# Patient Record
Sex: MALE | Race: WHITE | NOT HISPANIC OR LATINO | Employment: FULL TIME | URBAN - METROPOLITAN AREA
[De-identification: names, ages, dates, MRNs, and addresses within clinical notes are randomized per-mention and may not be internally consistent; named-entity substitution may affect disease eponyms.]

---

## 2021-04-23 ENCOUNTER — OFFICE VISIT (OUTPATIENT)
Dept: INTERNAL MEDICINE | Facility: CLINIC | Age: 61
End: 2021-04-23
Payer: COMMERCIAL

## 2021-04-23 ENCOUNTER — HOSPITAL ENCOUNTER (OUTPATIENT)
Dept: CARDIOLOGY | Facility: HOSPITAL | Age: 61
Discharge: HOME | End: 2021-04-23
Payer: COMMERCIAL

## 2021-04-23 VITALS
HEIGHT: 72 IN | SYSTOLIC BLOOD PRESSURE: 110 MMHG | TEMPERATURE: 98 F | WEIGHT: 221.31 LBS | DIASTOLIC BLOOD PRESSURE: 74 MMHG | HEART RATE: 86 BPM | BODY MASS INDEX: 29.97 KG/M2

## 2021-04-23 DIAGNOSIS — I10 HYPERTENSION, UNSPECIFIED TYPE: ICD-10-CM

## 2021-04-23 DIAGNOSIS — E78.5 DYSLIPIDEMIA ASSOCIATED WITH TYPE 2 DIABETES MELLITUS: ICD-10-CM

## 2021-04-23 DIAGNOSIS — Z11.59 NEED FOR HEPATITIS C SCREENING TEST: ICD-10-CM

## 2021-04-23 DIAGNOSIS — Z00.00 ANNUAL PHYSICAL EXAM: ICD-10-CM

## 2021-04-23 DIAGNOSIS — Z12.5 PROSTATE CANCER SCREENING: ICD-10-CM

## 2021-04-23 DIAGNOSIS — E11.69 DYSLIPIDEMIA ASSOCIATED WITH TYPE 2 DIABETES MELLITUS: ICD-10-CM

## 2021-04-23 DIAGNOSIS — E11.65 UNCONTROLLED TYPE 2 DIABETES MELLITUS WITH HYPERGLYCEMIA: Primary | ICD-10-CM

## 2021-04-23 DIAGNOSIS — N40.0 BENIGN PROSTATIC HYPERPLASIA, UNSPECIFIED WHETHER LOWER URINARY TRACT SYMPTOMS PRESENT: ICD-10-CM

## 2021-04-23 PROCEDURE — 93010 EKG 12-LEAD: ICD-10-PCS | Mod: S$GLB,,, | Performed by: INTERNAL MEDICINE

## 2021-04-23 PROCEDURE — 1126F PR PAIN SEVERITY QUANTIFIED, NO PAIN PRESENT: ICD-10-PCS | Mod: S$GLB,,, | Performed by: FAMILY MEDICINE

## 2021-04-23 PROCEDURE — 99204 OFFICE O/P NEW MOD 45 MIN: CPT | Mod: S$GLB,,, | Performed by: FAMILY MEDICINE

## 2021-04-23 PROCEDURE — 3008F BODY MASS INDEX DOCD: CPT | Mod: CPTII,S$GLB,, | Performed by: FAMILY MEDICINE

## 2021-04-23 PROCEDURE — 93005 ELECTROCARDIOGRAM TRACING: CPT | Mod: PO

## 2021-04-23 PROCEDURE — 99204 PR OFFICE/OUTPT VISIT, NEW, LEVL IV, 45-59 MIN: ICD-10-PCS | Mod: S$GLB,,, | Performed by: FAMILY MEDICINE

## 2021-04-23 PROCEDURE — 99999 PR PBB SHADOW E&M-NEW PATIENT-LVL IV: CPT | Mod: PBBFAC,,, | Performed by: FAMILY MEDICINE

## 2021-04-23 PROCEDURE — 1126F AMNT PAIN NOTED NONE PRSNT: CPT | Mod: S$GLB,,, | Performed by: FAMILY MEDICINE

## 2021-04-23 PROCEDURE — 3008F PR BODY MASS INDEX (BMI) DOCUMENTED: ICD-10-PCS | Mod: CPTII,S$GLB,, | Performed by: FAMILY MEDICINE

## 2021-04-23 PROCEDURE — 93010 ELECTROCARDIOGRAM REPORT: CPT | Mod: S$GLB,,, | Performed by: INTERNAL MEDICINE

## 2021-04-23 PROCEDURE — 99999 PR PBB SHADOW E&M-NEW PATIENT-LVL IV: ICD-10-PCS | Mod: PBBFAC,,, | Performed by: FAMILY MEDICINE

## 2021-04-23 RX ORDER — ESCITALOPRAM OXALATE 20 MG/1
20 TABLET ORAL DAILY
Qty: 30 TABLET | Refills: 6 | Status: SHIPPED | OUTPATIENT
Start: 2021-04-23 | End: 2021-11-23

## 2021-04-23 RX ORDER — GLIMEPIRIDE 4 MG/1
4 TABLET ORAL
COMMUNITY
End: 2021-10-21 | Stop reason: SDUPTHER

## 2021-04-23 RX ORDER — LISINOPRIL 10 MG/1
10 TABLET ORAL DAILY
Qty: 30 TABLET | Refills: 6 | Status: SHIPPED | OUTPATIENT
Start: 2021-04-23 | End: 2021-11-08

## 2021-04-23 RX ORDER — FLASH GLUCOSE SCANNING READER
EACH MISCELLANEOUS
Qty: 2 EACH | Refills: 6 | Status: SHIPPED | OUTPATIENT
Start: 2021-04-23 | End: 2022-08-26

## 2021-04-23 RX ORDER — FLASH GLUCOSE SENSOR
KIT MISCELLANEOUS
Qty: 1 KIT | Refills: 6 | Status: SHIPPED | OUTPATIENT
Start: 2021-04-23 | End: 2021-08-10

## 2021-04-23 RX ORDER — INSULIN GLARGINE 100 [IU]/ML
60 INJECTION, SOLUTION SUBCUTANEOUS DAILY
COMMUNITY
End: 2021-05-14 | Stop reason: SDUPTHER

## 2021-04-23 RX ORDER — METFORMIN HYDROCHLORIDE 1000 MG/1
1000 TABLET ORAL 2 TIMES DAILY WITH MEALS
COMMUNITY
End: 2021-04-23 | Stop reason: SDUPTHER

## 2021-04-23 RX ORDER — MULTIVITAMIN
1 TABLET ORAL DAILY
COMMUNITY

## 2021-04-23 RX ORDER — TADALAFIL 20 MG/1
20 TABLET ORAL DAILY PRN
COMMUNITY

## 2021-04-23 RX ORDER — ESCITALOPRAM OXALATE 20 MG/1
20 TABLET ORAL DAILY
COMMUNITY
End: 2021-04-23 | Stop reason: SDUPTHER

## 2021-04-23 RX ORDER — SIMVASTATIN 20 MG/1
20 TABLET, FILM COATED ORAL NIGHTLY
Qty: 30 TABLET | Refills: 6 | Status: SHIPPED | OUTPATIENT
Start: 2021-04-23 | End: 2021-11-08

## 2021-04-23 RX ORDER — OMEGA-3-ACID ETHYL ESTERS 1 G/1
2 CAPSULE, LIQUID FILLED ORAL 2 TIMES DAILY
COMMUNITY
End: 2021-04-23 | Stop reason: SDUPTHER

## 2021-04-23 RX ORDER — TAMSULOSIN HYDROCHLORIDE 0.4 MG/1
CAPSULE ORAL DAILY
COMMUNITY
End: 2021-10-21 | Stop reason: SDUPTHER

## 2021-04-23 RX ORDER — LISINOPRIL 10 MG/1
10 TABLET ORAL DAILY
COMMUNITY
End: 2021-04-23 | Stop reason: SDUPTHER

## 2021-04-23 RX ORDER — OMEGA-3-ACID ETHYL ESTERS 1 G/1
2 CAPSULE, LIQUID FILLED ORAL 2 TIMES DAILY
Qty: 60 CAPSULE | Refills: 6 | Status: SHIPPED | OUTPATIENT
Start: 2021-04-23 | End: 2021-06-10 | Stop reason: SDUPTHER

## 2021-04-23 RX ORDER — ASPIRIN 81 MG/1
81 TABLET ORAL DAILY
COMMUNITY

## 2021-04-23 RX ORDER — SIMVASTATIN 20 MG/1
20 TABLET, FILM COATED ORAL NIGHTLY
COMMUNITY
End: 2021-04-23 | Stop reason: SDUPTHER

## 2021-04-23 RX ORDER — METFORMIN HYDROCHLORIDE 1000 MG/1
1000 TABLET ORAL 2 TIMES DAILY WITH MEALS
Qty: 60 TABLET | Refills: 6 | Status: SHIPPED | OUTPATIENT
Start: 2021-04-23 | End: 2021-11-08

## 2021-04-23 RX ORDER — CALCIUM CARBONATE 600 MG
600 TABLET ORAL ONCE
COMMUNITY

## 2021-04-26 ENCOUNTER — PATIENT MESSAGE (OUTPATIENT)
Dept: INTERNAL MEDICINE | Facility: CLINIC | Age: 61
End: 2021-04-26

## 2021-04-26 DIAGNOSIS — R06.02 SOB (SHORTNESS OF BREATH): ICD-10-CM

## 2021-04-26 DIAGNOSIS — I10 HYPERTENSION, UNSPECIFIED TYPE: ICD-10-CM

## 2021-04-26 DIAGNOSIS — R94.31 ABNORMAL EKG: Primary | ICD-10-CM

## 2021-04-26 DIAGNOSIS — E11.65 UNCONTROLLED TYPE 2 DIABETES MELLITUS WITH HYPERGLYCEMIA: ICD-10-CM

## 2021-04-28 ENCOUNTER — PATIENT OUTREACH (OUTPATIENT)
Dept: ADMINISTRATIVE | Facility: HOSPITAL | Age: 61
End: 2021-04-28

## 2021-05-11 ENCOUNTER — OFFICE VISIT (OUTPATIENT)
Dept: OPHTHALMOLOGY | Facility: CLINIC | Age: 61
End: 2021-05-11
Payer: COMMERCIAL

## 2021-05-11 ENCOUNTER — LAB VISIT (OUTPATIENT)
Dept: LAB | Facility: HOSPITAL | Age: 61
End: 2021-05-11
Attending: FAMILY MEDICINE
Payer: COMMERCIAL

## 2021-05-11 DIAGNOSIS — I10 HYPERTENSION, UNSPECIFIED TYPE: ICD-10-CM

## 2021-05-11 DIAGNOSIS — E11.65 UNCONTROLLED TYPE 2 DIABETES MELLITUS WITH HYPERGLYCEMIA: ICD-10-CM

## 2021-05-11 DIAGNOSIS — E11.69 DYSLIPIDEMIA ASSOCIATED WITH TYPE 2 DIABETES MELLITUS: ICD-10-CM

## 2021-05-11 DIAGNOSIS — D31.32 CHOROIDAL NEVUS OF LEFT EYE: ICD-10-CM

## 2021-05-11 DIAGNOSIS — Z00.00 ANNUAL PHYSICAL EXAM: ICD-10-CM

## 2021-05-11 DIAGNOSIS — Z11.59 NEED FOR HEPATITIS C SCREENING TEST: ICD-10-CM

## 2021-05-11 DIAGNOSIS — E11.36 DIABETIC CATARACT: ICD-10-CM

## 2021-05-11 DIAGNOSIS — H52.13 MYOPIA OF BOTH EYES: ICD-10-CM

## 2021-05-11 DIAGNOSIS — E78.5 DYSLIPIDEMIA ASSOCIATED WITH TYPE 2 DIABETES MELLITUS: ICD-10-CM

## 2021-05-11 DIAGNOSIS — Z12.5 PROSTATE CANCER SCREENING: ICD-10-CM

## 2021-05-11 DIAGNOSIS — E11.3293 MILD NONPROLIFERATIVE DIABETIC RETINOPATHY OF BOTH EYES WITHOUT MACULAR EDEMA ASSOCIATED WITH TYPE 2 DIABETES MELLITUS: Primary | ICD-10-CM

## 2021-05-11 LAB
ALBUMIN SERPL BCP-MCNC: 4 G/DL (ref 3.5–5.2)
ALP SERPL-CCNC: 76 U/L (ref 55–135)
ALT SERPL W/O P-5'-P-CCNC: 60 U/L (ref 10–44)
ANION GAP SERPL CALC-SCNC: 9 MMOL/L (ref 8–16)
AST SERPL-CCNC: 45 U/L (ref 10–40)
BASOPHILS # BLD AUTO: 0.04 K/UL (ref 0–0.2)
BASOPHILS NFR BLD: 0.6 % (ref 0–1.9)
BILIRUB SERPL-MCNC: 0.7 MG/DL (ref 0.1–1)
BUN SERPL-MCNC: 15 MG/DL (ref 6–20)
CALCIUM SERPL-MCNC: 9.9 MG/DL (ref 8.7–10.5)
CHLORIDE SERPL-SCNC: 101 MMOL/L (ref 95–110)
CHOLEST SERPL-MCNC: 155 MG/DL (ref 120–199)
CHOLEST/HDLC SERPL: 3.1 {RATIO} (ref 2–5)
CO2 SERPL-SCNC: 27 MMOL/L (ref 23–29)
CREAT SERPL-MCNC: 0.9 MG/DL (ref 0.5–1.4)
DIFFERENTIAL METHOD: ABNORMAL
EOSINOPHIL # BLD AUTO: 0.1 K/UL (ref 0–0.5)
EOSINOPHIL NFR BLD: 1.3 % (ref 0–8)
ERYTHROCYTE [DISTWIDTH] IN BLOOD BY AUTOMATED COUNT: 12.7 % (ref 11.5–14.5)
EST. GFR  (AFRICAN AMERICAN): >60 ML/MIN/1.73 M^2
EST. GFR  (NON AFRICAN AMERICAN): >60 ML/MIN/1.73 M^2
ESTIMATED AVG GLUCOSE: 252 MG/DL (ref 68–131)
GLUCOSE SERPL-MCNC: 217 MG/DL (ref 70–110)
HBA1C MFR BLD: 10.4 % (ref 4–5.6)
HCT VFR BLD AUTO: 46.5 % (ref 40–54)
HDLC SERPL-MCNC: 50 MG/DL (ref 40–75)
HDLC SERPL: 32.3 % (ref 20–50)
HGB BLD-MCNC: 15.7 G/DL (ref 14–18)
IMM GRANULOCYTES # BLD AUTO: 0.01 K/UL (ref 0–0.04)
IMM GRANULOCYTES NFR BLD AUTO: 0.1 % (ref 0–0.5)
LDLC SERPL CALC-MCNC: 81.2 MG/DL (ref 63–159)
LYMPHOCYTES # BLD AUTO: 2.5 K/UL (ref 1–4.8)
LYMPHOCYTES NFR BLD: 34.8 % (ref 18–48)
MCH RBC QN AUTO: 31 PG (ref 27–31)
MCHC RBC AUTO-ENTMCNC: 33.8 G/DL (ref 32–36)
MCV RBC AUTO: 92 FL (ref 82–98)
MONOCYTES # BLD AUTO: 0.4 K/UL (ref 0.3–1)
MONOCYTES NFR BLD: 5.8 % (ref 4–15)
NEUTROPHILS # BLD AUTO: 4 K/UL (ref 1.8–7.7)
NEUTROPHILS NFR BLD: 57.4 % (ref 38–73)
NONHDLC SERPL-MCNC: 105 MG/DL
NRBC BLD-RTO: 0 /100 WBC
PLATELET # BLD AUTO: 136 K/UL (ref 150–450)
PMV BLD AUTO: 12.5 FL (ref 9.2–12.9)
POTASSIUM SERPL-SCNC: 4.5 MMOL/L (ref 3.5–5.1)
PROT SERPL-MCNC: 7.2 G/DL (ref 6–8.4)
RBC # BLD AUTO: 5.07 M/UL (ref 4.6–6.2)
SODIUM SERPL-SCNC: 137 MMOL/L (ref 136–145)
TRIGL SERPL-MCNC: 119 MG/DL (ref 30–150)
WBC # BLD AUTO: 7.04 K/UL (ref 3.9–12.7)

## 2021-05-11 PROCEDURE — 84153 ASSAY OF PSA TOTAL: CPT | Performed by: FAMILY MEDICINE

## 2021-05-11 PROCEDURE — 80061 LIPID PANEL: CPT | Performed by: FAMILY MEDICINE

## 2021-05-11 PROCEDURE — 80053 COMPREHEN METABOLIC PANEL: CPT | Performed by: FAMILY MEDICINE

## 2021-05-11 PROCEDURE — 92015 DETERMINE REFRACTIVE STATE: CPT | Mod: S$GLB,,, | Performed by: OPTOMETRIST

## 2021-05-11 PROCEDURE — 92134 OCT, RETINA - OU - BOTH EYES: ICD-10-PCS | Mod: S$GLB,,, | Performed by: OPTOMETRIST

## 2021-05-11 PROCEDURE — 86703 HIV-1/HIV-2 1 RESULT ANTBDY: CPT | Performed by: FAMILY MEDICINE

## 2021-05-11 PROCEDURE — 85025 COMPLETE CBC W/AUTO DIFF WBC: CPT | Performed by: FAMILY MEDICINE

## 2021-05-11 PROCEDURE — 92015 PR REFRACTION: ICD-10-PCS | Mod: S$GLB,,, | Performed by: OPTOMETRIST

## 2021-05-11 PROCEDURE — 92134 CPTRZ OPH DX IMG PST SGM RTA: CPT | Mod: S$GLB,,, | Performed by: OPTOMETRIST

## 2021-05-11 PROCEDURE — 36415 COLL VENOUS BLD VENIPUNCTURE: CPT | Mod: PO | Performed by: FAMILY MEDICINE

## 2021-05-11 PROCEDURE — 92004 COMPRE OPH EXAM NEW PT 1/>: CPT | Mod: S$GLB,,, | Performed by: OPTOMETRIST

## 2021-05-11 PROCEDURE — 99999 PR PBB SHADOW E&M-EST. PATIENT-LVL III: ICD-10-PCS | Mod: PBBFAC,,, | Performed by: OPTOMETRIST

## 2021-05-11 PROCEDURE — 99999 PR PBB SHADOW E&M-EST. PATIENT-LVL III: CPT | Mod: PBBFAC,,, | Performed by: OPTOMETRIST

## 2021-05-11 PROCEDURE — 92004 PR EYE EXAM, NEW PATIENT,COMPREHESV: ICD-10-PCS | Mod: S$GLB,,, | Performed by: OPTOMETRIST

## 2021-05-11 PROCEDURE — 86803 HEPATITIS C AB TEST: CPT | Performed by: FAMILY MEDICINE

## 2021-05-11 PROCEDURE — 83036 HEMOGLOBIN GLYCOSYLATED A1C: CPT | Performed by: FAMILY MEDICINE

## 2021-05-12 LAB
COMPLEXED PSA SERPL-MCNC: 0.51 NG/ML (ref 0–4)
HCV AB SERPL QL IA: NEGATIVE
HIV 1+2 AB+HIV1 P24 AG SERPL QL IA: NEGATIVE

## 2021-05-13 ENCOUNTER — PATIENT MESSAGE (OUTPATIENT)
Dept: INTERNAL MEDICINE | Facility: CLINIC | Age: 61
End: 2021-05-13

## 2021-05-13 DIAGNOSIS — R74.8 ELEVATED LIVER ENZYMES: Primary | ICD-10-CM

## 2021-05-14 RX ORDER — INSULIN GLARGINE 100 [IU]/ML
60 INJECTION, SOLUTION SUBCUTANEOUS DAILY
Qty: 6 SYRINGE | Refills: 11 | Status: SHIPPED | OUTPATIENT
Start: 2021-05-14 | End: 2022-06-13

## 2021-05-17 ENCOUNTER — OFFICE VISIT (OUTPATIENT)
Dept: CARDIOLOGY | Facility: CLINIC | Age: 61
End: 2021-05-17
Payer: COMMERCIAL

## 2021-05-17 ENCOUNTER — TELEPHONE (OUTPATIENT)
Dept: RADIOLOGY | Facility: HOSPITAL | Age: 61
End: 2021-05-17

## 2021-05-17 VITALS
RESPIRATION RATE: 16 BRPM | SYSTOLIC BLOOD PRESSURE: 140 MMHG | OXYGEN SATURATION: 97 % | HEIGHT: 72 IN | BODY MASS INDEX: 30.64 KG/M2 | HEART RATE: 60 BPM | WEIGHT: 226.19 LBS | DIASTOLIC BLOOD PRESSURE: 80 MMHG

## 2021-05-17 DIAGNOSIS — I10 HYPERTENSION, UNSPECIFIED TYPE: ICD-10-CM

## 2021-05-17 DIAGNOSIS — E78.49 OTHER HYPERLIPIDEMIA: ICD-10-CM

## 2021-05-17 DIAGNOSIS — R06.02 SOB (SHORTNESS OF BREATH): Primary | ICD-10-CM

## 2021-05-17 DIAGNOSIS — E11.65 UNCONTROLLED TYPE 2 DIABETES MELLITUS WITH HYPERGLYCEMIA: ICD-10-CM

## 2021-05-17 DIAGNOSIS — I10 ESSENTIAL HYPERTENSION: ICD-10-CM

## 2021-05-17 DIAGNOSIS — E11.69 TYPE 2 DIABETES MELLITUS WITH OTHER SPECIFIED COMPLICATION, UNSPECIFIED WHETHER LONG TERM INSULIN USE: ICD-10-CM

## 2021-05-17 PROCEDURE — 99205 OFFICE O/P NEW HI 60 MIN: CPT | Mod: S$GLB,,, | Performed by: INTERNAL MEDICINE

## 2021-05-17 PROCEDURE — 99205 PR OFFICE/OUTPT VISIT, NEW, LEVL V, 60-74 MIN: ICD-10-PCS | Mod: S$GLB,,, | Performed by: INTERNAL MEDICINE

## 2021-05-17 PROCEDURE — 99999 PR PBB SHADOW E&M-EST. PATIENT-LVL IV: CPT | Mod: PBBFAC,,, | Performed by: INTERNAL MEDICINE

## 2021-05-17 PROCEDURE — 99999 PR PBB SHADOW E&M-EST. PATIENT-LVL IV: ICD-10-PCS | Mod: PBBFAC,,, | Performed by: INTERNAL MEDICINE

## 2021-05-17 PROCEDURE — 3008F PR BODY MASS INDEX (BMI) DOCUMENTED: ICD-10-PCS | Mod: CPTII,S$GLB,, | Performed by: INTERNAL MEDICINE

## 2021-05-17 PROCEDURE — 3008F BODY MASS INDEX DOCD: CPT | Mod: CPTII,S$GLB,, | Performed by: INTERNAL MEDICINE

## 2021-05-18 ENCOUNTER — HOSPITAL ENCOUNTER (OUTPATIENT)
Dept: RADIOLOGY | Facility: HOSPITAL | Age: 61
Discharge: HOME | End: 2021-05-18
Attending: FAMILY MEDICINE
Payer: COMMERCIAL

## 2021-05-18 DIAGNOSIS — R74.8 ELEVATED LIVER ENZYMES: ICD-10-CM

## 2021-05-18 PROCEDURE — 76705 ECHO EXAM OF ABDOMEN: CPT | Mod: TC

## 2021-05-18 PROCEDURE — 76705 ECHO EXAM OF ABDOMEN: CPT | Mod: 26,,, | Performed by: RADIOLOGY

## 2021-05-18 PROCEDURE — 76705 US ABDOMEN LIMITED: ICD-10-PCS | Mod: 26,,, | Performed by: RADIOLOGY

## 2021-05-19 ENCOUNTER — TELEPHONE (OUTPATIENT)
Dept: INTERNAL MEDICINE | Facility: CLINIC | Age: 61
End: 2021-05-19

## 2021-05-25 ENCOUNTER — TELEPHONE (OUTPATIENT)
Dept: INTERNAL MEDICINE | Facility: CLINIC | Age: 61
End: 2021-05-25

## 2021-05-25 RX ORDER — INSULIN GLARGINE 100 [IU]/ML
60 INJECTION, SOLUTION SUBCUTANEOUS DAILY
Qty: 6 SYRINGE | Refills: 11 | Status: CANCELLED | OUTPATIENT
Start: 2021-05-25

## 2021-05-27 ENCOUNTER — HOSPITAL ENCOUNTER (OUTPATIENT)
Dept: CARDIOLOGY | Facility: HOSPITAL | Age: 61
Discharge: HOME | End: 2021-05-27
Attending: INTERNAL MEDICINE
Payer: COMMERCIAL

## 2021-05-27 VITALS — WEIGHT: 226 LBS | HEIGHT: 71 IN | BODY MASS INDEX: 31.64 KG/M2

## 2021-05-27 DIAGNOSIS — E78.49 OTHER HYPERLIPIDEMIA: ICD-10-CM

## 2021-05-27 DIAGNOSIS — E11.65 UNCONTROLLED TYPE 2 DIABETES MELLITUS WITH HYPERGLYCEMIA: ICD-10-CM

## 2021-05-27 DIAGNOSIS — E11.69 TYPE 2 DIABETES MELLITUS WITH OTHER SPECIFIED COMPLICATION, UNSPECIFIED WHETHER LONG TERM INSULIN USE: ICD-10-CM

## 2021-05-27 DIAGNOSIS — I10 HYPERTENSION, UNSPECIFIED TYPE: ICD-10-CM

## 2021-05-27 DIAGNOSIS — R06.02 SOB (SHORTNESS OF BREATH): ICD-10-CM

## 2021-05-27 DIAGNOSIS — I10 ESSENTIAL HYPERTENSION: ICD-10-CM

## 2021-05-27 LAB
AORTIC ROOT ANNULUS: 3 CM
AV INDEX (PROSTH): 0.84
AV MEAN GRADIENT: 5 MMHG
AV PEAK GRADIENT: 9 MMHG
AV VALVE AREA: 2.72 CM2
AV VELOCITY RATIO: 0.84
BSA FOR ECHO PROCEDURE: 2.27 M2
CV ECHO LV RWT: 0.65 CM
DOP CALC AO PEAK VEL: 1.46 M/S
DOP CALC AO VTI: 32.98 CM
DOP CALC LVOT AREA: 3.2 CM2
DOP CALC LVOT DIAMETER: 2.03 CM
DOP CALC LVOT PEAK VEL: 1.22 M/S
DOP CALC LVOT STROKE VOLUME: 89.87 CM3
DOP CALC RVOT PEAK VEL: 0.7 M/S
DOP CALC RVOT VTI: 17.04 CM
DOP CALCLVOT PEAK VEL VTI: 27.78 CM
E WAVE DECELERATION TIME: 231.82 MSEC
E/A RATIO: 1.16
E/E' RATIO: 9.4 M/S
ECHO LV POSTERIOR WALL: 1.32 CM (ref 0.6–1.1)
EJECTION FRACTION: 60 %
FRACTIONAL SHORTENING: 34 % (ref 28–44)
INTERVENTRICULAR SEPTUM: 1.54 CM (ref 0.6–1.1)
IVRT: 82.78 MSEC
LA MAJOR: 5.07 CM
LA MINOR: 4.32 CM
LA WIDTH: 3.56 CM
LEFT ATRIUM SIZE: 3.21 CM
LEFT ATRIUM VOLUME INDEX: 20.4 ML/M2
LEFT ATRIUM VOLUME: 45.31 CM3
LEFT INTERNAL DIMENSION IN SYSTOLE: 2.66 CM (ref 2.1–4)
LEFT VENTRICLE DIASTOLIC VOLUME INDEX: 32.27 ML/M2
LEFT VENTRICLE DIASTOLIC VOLUME: 71.65 ML
LEFT VENTRICLE MASS INDEX: 99 G/M2
LEFT VENTRICLE SYSTOLIC VOLUME INDEX: 11.7 ML/M2
LEFT VENTRICLE SYSTOLIC VOLUME: 26.01 ML
LEFT VENTRICULAR INTERNAL DIMENSION IN DIASTOLE: 4.04 CM (ref 3.5–6)
LEFT VENTRICULAR MASS: 219.06 G
LV LATERAL E/E' RATIO: 8.55 M/S
LV SEPTAL E/E' RATIO: 10.44 M/S
MV A" WAVE DURATION": 9.99 MSEC
MV PEAK A VEL: 0.81 M/S
MV PEAK E VEL: 0.94 M/S
PISA TR MAX VEL: 2.76 M/S
PULM VEIN S/D RATIO: 1.03
PV MEAN GRADIENT: 1 MMHG
PV PEAK D VEL: 0.67 M/S
PV PEAK S VEL: 0.69 M/S
PV PEAK VELOCITY: 1.07 CM/S
RA MAJOR: 4.65 CM
RA PRESSURE: 3 MMHG
RA WIDTH: 2.65 CM
RIGHT VENTRICULAR END-DIASTOLIC DIMENSION: 2.45 CM
SINUS: 2.43 CM
STJ: 2.56 CM
TDI LATERAL: 0.11 M/S
TDI SEPTAL: 0.09 M/S
TDI: 0.1 M/S
TR MAX PG: 30 MMHG
TRICUSPID ANNULAR PLANE SYSTOLIC EXCURSION: 2.36 CM
TV REST PULMONARY ARTERY PRESSURE: 33 MMHG

## 2021-05-27 PROCEDURE — 93306 ECHO (CUPID ONLY): ICD-10-PCS | Mod: 26,,, | Performed by: INTERNAL MEDICINE

## 2021-05-27 PROCEDURE — 93306 TTE W/DOPPLER COMPLETE: CPT | Mod: PO

## 2021-05-27 PROCEDURE — 93306 TTE W/DOPPLER COMPLETE: CPT | Mod: 26,,, | Performed by: INTERNAL MEDICINE

## 2021-06-11 ENCOUNTER — OFFICE VISIT (OUTPATIENT)
Dept: INTERNAL MEDICINE | Facility: CLINIC | Age: 61
End: 2021-06-11
Payer: COMMERCIAL

## 2021-06-11 VITALS
SYSTOLIC BLOOD PRESSURE: 120 MMHG | TEMPERATURE: 98 F | HEIGHT: 71 IN | RESPIRATION RATE: 18 BRPM | BODY MASS INDEX: 31.11 KG/M2 | WEIGHT: 222.25 LBS | HEART RATE: 74 BPM | DIASTOLIC BLOOD PRESSURE: 73 MMHG

## 2021-06-11 DIAGNOSIS — E11.65 UNCONTROLLED TYPE 2 DIABETES MELLITUS WITH HYPERGLYCEMIA: Primary | ICD-10-CM

## 2021-06-11 DIAGNOSIS — I10 HYPERTENSION, UNSPECIFIED TYPE: ICD-10-CM

## 2021-06-11 DIAGNOSIS — E78.5 DYSLIPIDEMIA ASSOCIATED WITH TYPE 2 DIABETES MELLITUS: ICD-10-CM

## 2021-06-11 DIAGNOSIS — R74.8 ELEVATED LIVER ENZYMES: ICD-10-CM

## 2021-06-11 DIAGNOSIS — E11.69 DYSLIPIDEMIA ASSOCIATED WITH TYPE 2 DIABETES MELLITUS: ICD-10-CM

## 2021-06-11 PROCEDURE — 3008F PR BODY MASS INDEX (BMI) DOCUMENTED: ICD-10-PCS | Mod: CPTII,S$GLB,, | Performed by: NURSE PRACTITIONER

## 2021-06-11 PROCEDURE — 1126F AMNT PAIN NOTED NONE PRSNT: CPT | Mod: S$GLB,,, | Performed by: NURSE PRACTITIONER

## 2021-06-11 PROCEDURE — 3008F BODY MASS INDEX DOCD: CPT | Mod: CPTII,S$GLB,, | Performed by: NURSE PRACTITIONER

## 2021-06-11 PROCEDURE — 1126F PR PAIN SEVERITY QUANTIFIED, NO PAIN PRESENT: ICD-10-PCS | Mod: S$GLB,,, | Performed by: NURSE PRACTITIONER

## 2021-06-11 PROCEDURE — 99999 PR PBB SHADOW E&M-EST. PATIENT-LVL IV: ICD-10-PCS | Mod: PBBFAC,,, | Performed by: NURSE PRACTITIONER

## 2021-06-11 PROCEDURE — 99214 PR OFFICE/OUTPT VISIT, EST, LEVL IV, 30-39 MIN: ICD-10-PCS | Mod: S$GLB,,, | Performed by: NURSE PRACTITIONER

## 2021-06-11 PROCEDURE — 99214 OFFICE O/P EST MOD 30 MIN: CPT | Mod: S$GLB,,, | Performed by: NURSE PRACTITIONER

## 2021-06-11 PROCEDURE — 3046F PR MOST RECENT HEMOGLOBIN A1C LEVEL > 9.0%: ICD-10-PCS | Mod: CPTII,S$GLB,, | Performed by: NURSE PRACTITIONER

## 2021-06-11 PROCEDURE — 99999 PR PBB SHADOW E&M-EST. PATIENT-LVL IV: CPT | Mod: PBBFAC,,, | Performed by: NURSE PRACTITIONER

## 2021-06-11 PROCEDURE — 3046F HEMOGLOBIN A1C LEVEL >9.0%: CPT | Mod: CPTII,S$GLB,, | Performed by: NURSE PRACTITIONER

## 2021-06-15 ENCOUNTER — HOSPITAL ENCOUNTER (OUTPATIENT)
Dept: CARDIOLOGY | Facility: HOSPITAL | Age: 61
Discharge: HOME | End: 2021-06-15
Attending: INTERNAL MEDICINE
Payer: COMMERCIAL

## 2021-06-15 ENCOUNTER — HOSPITAL ENCOUNTER (OUTPATIENT)
Dept: RADIOLOGY | Facility: HOSPITAL | Age: 61
Discharge: HOME | End: 2021-06-15
Attending: INTERNAL MEDICINE
Payer: COMMERCIAL

## 2021-06-15 DIAGNOSIS — E78.49 OTHER HYPERLIPIDEMIA: ICD-10-CM

## 2021-06-15 DIAGNOSIS — I10 ESSENTIAL HYPERTENSION: ICD-10-CM

## 2021-06-15 DIAGNOSIS — R06.02 SOB (SHORTNESS OF BREATH): ICD-10-CM

## 2021-06-15 DIAGNOSIS — E11.69 TYPE 2 DIABETES MELLITUS WITH OTHER SPECIFIED COMPLICATION, UNSPECIFIED WHETHER LONG TERM INSULIN USE: ICD-10-CM

## 2021-06-15 DIAGNOSIS — I10 HYPERTENSION, UNSPECIFIED TYPE: ICD-10-CM

## 2021-06-15 DIAGNOSIS — E11.65 UNCONTROLLED TYPE 2 DIABETES MELLITUS WITH HYPERGLYCEMIA: ICD-10-CM

## 2021-06-15 LAB
CV STRESS BASE HR: 79 BPM
DIASTOLIC BLOOD PRESSURE: 72 MMHG
NUC REST EJECTION FRACTION: 72
NUC STRESS EJECTION FRACTION: 72 %
OHS CV CPX 85 PERCENT MAX PREDICTED HEART RATE MALE: 136
OHS CV CPX ESTIMATED METS: 7
OHS CV CPX MAX PREDICTED HEART RATE: 160
OHS CV CPX PATIENT IS FEMALE: 0
OHS CV CPX PATIENT IS MALE: 1
OHS CV CPX PEAK DIASTOLIC BLOOD PRESSURE: 70 MMHG
OHS CV CPX PEAK HEAR RATE: 141 BPM
OHS CV CPX PEAK RATE PRESSURE PRODUCT: NORMAL
OHS CV CPX PEAK SYSTOLIC BLOOD PRESSURE: 168 MMHG
OHS CV CPX PERCENT MAX PREDICTED HEART RATE ACHIEVED: 88
OHS CV CPX RATE PRESSURE PRODUCT PRESENTING: 9559
STRESS ECHO POST EXERCISE DUR MIN: 6 MINUTES
STRESS ECHO POST EXERCISE DUR SEC: 16 SECONDS
SYSTOLIC BLOOD PRESSURE: 121 MMHG

## 2021-06-15 PROCEDURE — 93018 STRESS TEST WITH MYOCARDIAL PERFUSION (CUPID ONLY): ICD-10-PCS | Mod: ,,, | Performed by: INTERNAL MEDICINE

## 2021-06-15 PROCEDURE — A9502 TC99M TETROFOSMIN: HCPCS

## 2021-06-15 PROCEDURE — 93016 CV STRESS TEST SUPVJ ONLY: CPT | Mod: ,,, | Performed by: INTERNAL MEDICINE

## 2021-06-15 PROCEDURE — 78452 STRESS TEST WITH MYOCARDIAL PERFUSION (CUPID ONLY): ICD-10-PCS | Mod: 26,,, | Performed by: INTERNAL MEDICINE

## 2021-06-15 PROCEDURE — 78452 HT MUSCLE IMAGE SPECT MULT: CPT | Mod: 26,,, | Performed by: INTERNAL MEDICINE

## 2021-06-15 PROCEDURE — 78452 HT MUSCLE IMAGE SPECT MULT: CPT

## 2021-06-15 PROCEDURE — 93017 CV STRESS TEST TRACING ONLY: CPT

## 2021-06-15 PROCEDURE — 93016 STRESS TEST WITH MYOCARDIAL PERFUSION (CUPID ONLY): ICD-10-PCS | Mod: ,,, | Performed by: INTERNAL MEDICINE

## 2021-06-15 PROCEDURE — 93018 CV STRESS TEST I&R ONLY: CPT | Mod: ,,, | Performed by: INTERNAL MEDICINE

## 2021-07-30 ENCOUNTER — LAB VISIT (OUTPATIENT)
Dept: LAB | Facility: HOSPITAL | Age: 61
End: 2021-07-30
Attending: FAMILY MEDICINE
Payer: COMMERCIAL

## 2021-07-30 DIAGNOSIS — R74.8 ELEVATED LIVER ENZYMES: ICD-10-CM

## 2021-07-30 DIAGNOSIS — E11.65 UNCONTROLLED TYPE 2 DIABETES MELLITUS WITH HYPERGLYCEMIA: ICD-10-CM

## 2021-07-30 LAB
ESTIMATED AVG GLUCOSE: 177 MG/DL (ref 68–131)
HBA1C MFR BLD: 7.8 % (ref 4–5.6)

## 2021-07-30 PROCEDURE — 36415 COLL VENOUS BLD VENIPUNCTURE: CPT | Mod: PO | Performed by: NURSE PRACTITIONER

## 2021-07-30 PROCEDURE — 80053 COMPREHEN METABOLIC PANEL: CPT | Performed by: NURSE PRACTITIONER

## 2021-07-30 PROCEDURE — 83036 HEMOGLOBIN GLYCOSYLATED A1C: CPT | Performed by: NURSE PRACTITIONER

## 2021-07-31 LAB
ALBUMIN SERPL BCP-MCNC: 4 G/DL (ref 3.5–5.2)
ALP SERPL-CCNC: 61 U/L (ref 55–135)
ALT SERPL W/O P-5'-P-CCNC: 50 U/L (ref 10–44)
ANION GAP SERPL CALC-SCNC: 15 MMOL/L (ref 8–16)
AST SERPL-CCNC: 64 U/L (ref 10–40)
BILIRUB SERPL-MCNC: 0.4 MG/DL (ref 0.1–1)
BUN SERPL-MCNC: 17 MG/DL (ref 8–23)
CALCIUM SERPL-MCNC: 10.7 MG/DL (ref 8.7–10.5)
CHLORIDE SERPL-SCNC: 103 MMOL/L (ref 95–110)
CO2 SERPL-SCNC: 21 MMOL/L (ref 23–29)
CREAT SERPL-MCNC: 0.9 MG/DL (ref 0.5–1.4)
EST. GFR  (AFRICAN AMERICAN): >60 ML/MIN/1.73 M^2
EST. GFR  (NON AFRICAN AMERICAN): >60 ML/MIN/1.73 M^2
GLUCOSE SERPL-MCNC: 118 MG/DL (ref 70–110)
POTASSIUM SERPL-SCNC: 4.4 MMOL/L (ref 3.5–5.1)
PROT SERPL-MCNC: 7.4 G/DL (ref 6–8.4)
SODIUM SERPL-SCNC: 139 MMOL/L (ref 136–145)

## 2021-08-13 ENCOUNTER — OFFICE VISIT (OUTPATIENT)
Dept: INTERNAL MEDICINE | Facility: CLINIC | Age: 61
End: 2021-08-13
Payer: COMMERCIAL

## 2021-08-13 VITALS
WEIGHT: 228.19 LBS | DIASTOLIC BLOOD PRESSURE: 82 MMHG | BODY MASS INDEX: 31.82 KG/M2 | TEMPERATURE: 98 F | HEART RATE: 63 BPM | OXYGEN SATURATION: 97 % | SYSTOLIC BLOOD PRESSURE: 118 MMHG

## 2021-08-13 DIAGNOSIS — E78.5 DYSLIPIDEMIA ASSOCIATED WITH TYPE 2 DIABETES MELLITUS: ICD-10-CM

## 2021-08-13 DIAGNOSIS — I10 HYPERTENSION, UNSPECIFIED TYPE: ICD-10-CM

## 2021-08-13 DIAGNOSIS — E11.65 UNCONTROLLED TYPE 2 DIABETES MELLITUS WITH HYPERGLYCEMIA: Primary | ICD-10-CM

## 2021-08-13 DIAGNOSIS — E11.69 DYSLIPIDEMIA ASSOCIATED WITH TYPE 2 DIABETES MELLITUS: ICD-10-CM

## 2021-08-13 DIAGNOSIS — K76.0 FATTY LIVER: ICD-10-CM

## 2021-08-13 PROCEDURE — 99999 PR PBB SHADOW E&M-EST. PATIENT-LVL IV: CPT | Mod: PBBFAC,,, | Performed by: FAMILY MEDICINE

## 2021-08-13 PROCEDURE — 1159F PR MEDICATION LIST DOCUMENTED IN MEDICAL RECORD: ICD-10-PCS | Mod: CPTII,S$GLB,, | Performed by: FAMILY MEDICINE

## 2021-08-13 PROCEDURE — 99214 PR OFFICE/OUTPT VISIT, EST, LEVL IV, 30-39 MIN: ICD-10-PCS | Mod: S$GLB,,, | Performed by: FAMILY MEDICINE

## 2021-08-13 PROCEDURE — 3074F SYST BP LT 130 MM HG: CPT | Mod: CPTII,S$GLB,, | Performed by: FAMILY MEDICINE

## 2021-08-13 PROCEDURE — 3008F PR BODY MASS INDEX (BMI) DOCUMENTED: ICD-10-PCS | Mod: CPTII,S$GLB,, | Performed by: FAMILY MEDICINE

## 2021-08-13 PROCEDURE — 1159F MED LIST DOCD IN RCRD: CPT | Mod: CPTII,S$GLB,, | Performed by: FAMILY MEDICINE

## 2021-08-13 PROCEDURE — 1160F RVW MEDS BY RX/DR IN RCRD: CPT | Mod: CPTII,S$GLB,, | Performed by: FAMILY MEDICINE

## 2021-08-13 PROCEDURE — 99999 PR PBB SHADOW E&M-EST. PATIENT-LVL IV: ICD-10-PCS | Mod: PBBFAC,,, | Performed by: FAMILY MEDICINE

## 2021-08-13 PROCEDURE — 1125F PR PAIN SEVERITY QUANTIFIED, PAIN PRESENT: ICD-10-PCS | Mod: CPTII,S$GLB,, | Performed by: FAMILY MEDICINE

## 2021-08-13 PROCEDURE — 3079F PR MOST RECENT DIASTOLIC BLOOD PRESSURE 80-89 MM HG: ICD-10-PCS | Mod: CPTII,S$GLB,, | Performed by: FAMILY MEDICINE

## 2021-08-13 PROCEDURE — 3079F DIAST BP 80-89 MM HG: CPT | Mod: CPTII,S$GLB,, | Performed by: FAMILY MEDICINE

## 2021-08-13 PROCEDURE — 3074F PR MOST RECENT SYSTOLIC BLOOD PRESSURE < 130 MM HG: ICD-10-PCS | Mod: CPTII,S$GLB,, | Performed by: FAMILY MEDICINE

## 2021-08-13 PROCEDURE — 3051F PR MOST RECENT HEMOGLOBIN A1C LEVEL 7.0 - < 8.0%: ICD-10-PCS | Mod: CPTII,S$GLB,, | Performed by: FAMILY MEDICINE

## 2021-08-13 PROCEDURE — 99214 OFFICE O/P EST MOD 30 MIN: CPT | Mod: S$GLB,,, | Performed by: FAMILY MEDICINE

## 2021-08-13 PROCEDURE — 1125F AMNT PAIN NOTED PAIN PRSNT: CPT | Mod: CPTII,S$GLB,, | Performed by: FAMILY MEDICINE

## 2021-08-13 PROCEDURE — 3051F HG A1C>EQUAL 7.0%<8.0%: CPT | Mod: CPTII,S$GLB,, | Performed by: FAMILY MEDICINE

## 2021-08-13 PROCEDURE — 1160F PR REVIEW ALL MEDS BY PRESCRIBER/CLIN PHARMACIST DOCUMENTED: ICD-10-PCS | Mod: CPTII,S$GLB,, | Performed by: FAMILY MEDICINE

## 2021-08-13 PROCEDURE — 3008F BODY MASS INDEX DOCD: CPT | Mod: CPTII,S$GLB,, | Performed by: FAMILY MEDICINE

## 2021-08-23 ENCOUNTER — OFFICE VISIT (OUTPATIENT)
Dept: CARDIOLOGY | Facility: CLINIC | Age: 61
End: 2021-08-23
Payer: COMMERCIAL

## 2021-08-23 VITALS
BODY MASS INDEX: 31.6 KG/M2 | HEART RATE: 77 BPM | SYSTOLIC BLOOD PRESSURE: 120 MMHG | WEIGHT: 225.75 LBS | HEIGHT: 71 IN | OXYGEN SATURATION: 98 % | DIASTOLIC BLOOD PRESSURE: 78 MMHG

## 2021-08-23 DIAGNOSIS — R06.09 OTHER FORM OF DYSPNEA: Primary | ICD-10-CM

## 2021-08-23 DIAGNOSIS — R94.31 NONSPECIFIC ABNORMAL ELECTROCARDIOGRAM (ECG) (EKG): ICD-10-CM

## 2021-08-23 DIAGNOSIS — E11.69 TYPE 2 DIABETES MELLITUS WITH OTHER SPECIFIED COMPLICATION, UNSPECIFIED WHETHER LONG TERM INSULIN USE: ICD-10-CM

## 2021-08-23 DIAGNOSIS — I10 ESSENTIAL HYPERTENSION: ICD-10-CM

## 2021-08-23 DIAGNOSIS — E78.49 OTHER HYPERLIPIDEMIA: ICD-10-CM

## 2021-08-23 PROCEDURE — 3008F PR BODY MASS INDEX (BMI) DOCUMENTED: ICD-10-PCS | Mod: CPTII,S$GLB,, | Performed by: INTERNAL MEDICINE

## 2021-08-23 PROCEDURE — 3074F PR MOST RECENT SYSTOLIC BLOOD PRESSURE < 130 MM HG: ICD-10-PCS | Mod: CPTII,S$GLB,, | Performed by: INTERNAL MEDICINE

## 2021-08-23 PROCEDURE — 99999 PR PBB SHADOW E&M-EST. PATIENT-LVL IV: ICD-10-PCS | Mod: PBBFAC,,, | Performed by: INTERNAL MEDICINE

## 2021-08-23 PROCEDURE — 3078F DIAST BP <80 MM HG: CPT | Mod: CPTII,S$GLB,, | Performed by: INTERNAL MEDICINE

## 2021-08-23 PROCEDURE — 1160F RVW MEDS BY RX/DR IN RCRD: CPT | Mod: CPTII,S$GLB,, | Performed by: INTERNAL MEDICINE

## 2021-08-23 PROCEDURE — 1159F MED LIST DOCD IN RCRD: CPT | Mod: CPTII,S$GLB,, | Performed by: INTERNAL MEDICINE

## 2021-08-23 PROCEDURE — 1126F PR PAIN SEVERITY QUANTIFIED, NO PAIN PRESENT: ICD-10-PCS | Mod: CPTII,S$GLB,, | Performed by: INTERNAL MEDICINE

## 2021-08-23 PROCEDURE — 3051F HG A1C>EQUAL 7.0%<8.0%: CPT | Mod: CPTII,S$GLB,, | Performed by: INTERNAL MEDICINE

## 2021-08-23 PROCEDURE — 3074F SYST BP LT 130 MM HG: CPT | Mod: CPTII,S$GLB,, | Performed by: INTERNAL MEDICINE

## 2021-08-23 PROCEDURE — 99214 PR OFFICE/OUTPT VISIT, EST, LEVL IV, 30-39 MIN: ICD-10-PCS | Mod: S$GLB,,, | Performed by: INTERNAL MEDICINE

## 2021-08-23 PROCEDURE — 3078F PR MOST RECENT DIASTOLIC BLOOD PRESSURE < 80 MM HG: ICD-10-PCS | Mod: CPTII,S$GLB,, | Performed by: INTERNAL MEDICINE

## 2021-08-23 PROCEDURE — 1159F PR MEDICATION LIST DOCUMENTED IN MEDICAL RECORD: ICD-10-PCS | Mod: CPTII,S$GLB,, | Performed by: INTERNAL MEDICINE

## 2021-08-23 PROCEDURE — 99214 OFFICE O/P EST MOD 30 MIN: CPT | Mod: S$GLB,,, | Performed by: INTERNAL MEDICINE

## 2021-08-23 PROCEDURE — 1126F AMNT PAIN NOTED NONE PRSNT: CPT | Mod: CPTII,S$GLB,, | Performed by: INTERNAL MEDICINE

## 2021-08-23 PROCEDURE — 99999 PR PBB SHADOW E&M-EST. PATIENT-LVL IV: CPT | Mod: PBBFAC,,, | Performed by: INTERNAL MEDICINE

## 2021-08-23 PROCEDURE — 1160F PR REVIEW ALL MEDS BY PRESCRIBER/CLIN PHARMACIST DOCUMENTED: ICD-10-PCS | Mod: CPTII,S$GLB,, | Performed by: INTERNAL MEDICINE

## 2021-08-23 PROCEDURE — 3051F PR MOST RECENT HEMOGLOBIN A1C LEVEL 7.0 - < 8.0%: ICD-10-PCS | Mod: CPTII,S$GLB,, | Performed by: INTERNAL MEDICINE

## 2021-08-23 PROCEDURE — 3008F BODY MASS INDEX DOCD: CPT | Mod: CPTII,S$GLB,, | Performed by: INTERNAL MEDICINE

## 2021-10-21 ENCOUNTER — PATIENT MESSAGE (OUTPATIENT)
Dept: INTERNAL MEDICINE | Facility: CLINIC | Age: 61
End: 2021-10-21
Payer: COMMERCIAL

## 2021-10-21 RX ORDER — TAMSULOSIN HYDROCHLORIDE 0.4 MG/1
0.4 CAPSULE ORAL DAILY
Qty: 90 CAPSULE | Refills: 2 | Status: SHIPPED | OUTPATIENT
Start: 2021-10-21 | End: 2022-07-16

## 2021-10-21 RX ORDER — GLIMEPIRIDE 4 MG/1
4 TABLET ORAL
Qty: 90 TABLET | Refills: 2 | Status: SHIPPED | OUTPATIENT
Start: 2021-10-21 | End: 2022-07-18

## 2021-11-03 ENCOUNTER — LAB VISIT (OUTPATIENT)
Dept: LAB | Facility: HOSPITAL | Age: 61
End: 2021-11-03
Attending: FAMILY MEDICINE
Payer: COMMERCIAL

## 2021-11-03 DIAGNOSIS — K76.0 FATTY LIVER: ICD-10-CM

## 2021-11-03 DIAGNOSIS — E11.65 UNCONTROLLED TYPE 2 DIABETES MELLITUS WITH HYPERGLYCEMIA: ICD-10-CM

## 2021-11-03 LAB
ALBUMIN SERPL BCP-MCNC: 3.8 G/DL (ref 3.5–5.2)
ALP SERPL-CCNC: 49 U/L (ref 55–135)
ALT SERPL W/O P-5'-P-CCNC: 44 U/L (ref 10–44)
ANION GAP SERPL CALC-SCNC: 8 MMOL/L (ref 8–16)
AST SERPL-CCNC: 39 U/L (ref 10–40)
BILIRUB SERPL-MCNC: 0.6 MG/DL (ref 0.1–1)
BUN SERPL-MCNC: 14 MG/DL (ref 8–23)
CALCIUM SERPL-MCNC: 9.9 MG/DL (ref 8.7–10.5)
CHLORIDE SERPL-SCNC: 105 MMOL/L (ref 95–110)
CO2 SERPL-SCNC: 27 MMOL/L (ref 23–29)
CREAT SERPL-MCNC: 0.8 MG/DL (ref 0.5–1.4)
EST. GFR  (AFRICAN AMERICAN): >60 ML/MIN/1.73 M^2
EST. GFR  (NON AFRICAN AMERICAN): >60 ML/MIN/1.73 M^2
ESTIMATED AVG GLUCOSE: 163 MG/DL (ref 68–131)
GLUCOSE SERPL-MCNC: 120 MG/DL (ref 70–110)
HBA1C MFR BLD: 7.3 % (ref 4–5.6)
POTASSIUM SERPL-SCNC: 4.5 MMOL/L (ref 3.5–5.1)
PROT SERPL-MCNC: 6.6 G/DL (ref 6–8.4)
SODIUM SERPL-SCNC: 140 MMOL/L (ref 136–145)

## 2021-11-03 PROCEDURE — 83036 HEMOGLOBIN GLYCOSYLATED A1C: CPT | Performed by: FAMILY MEDICINE

## 2021-11-03 PROCEDURE — 80053 COMPREHEN METABOLIC PANEL: CPT | Performed by: FAMILY MEDICINE

## 2021-11-03 PROCEDURE — 36415 COLL VENOUS BLD VENIPUNCTURE: CPT | Mod: PO | Performed by: FAMILY MEDICINE

## 2021-11-10 ENCOUNTER — OFFICE VISIT (OUTPATIENT)
Dept: INTERNAL MEDICINE | Facility: CLINIC | Age: 61
End: 2021-11-10
Payer: COMMERCIAL

## 2021-11-10 ENCOUNTER — HOSPITAL ENCOUNTER (OUTPATIENT)
Dept: RADIOLOGY | Facility: HOSPITAL | Age: 61
Discharge: HOME | End: 2021-11-10
Attending: FAMILY MEDICINE
Payer: COMMERCIAL

## 2021-11-10 VITALS
WEIGHT: 228.63 LBS | BODY MASS INDEX: 32.01 KG/M2 | DIASTOLIC BLOOD PRESSURE: 82 MMHG | TEMPERATURE: 99 F | HEIGHT: 71 IN | HEART RATE: 82 BPM | SYSTOLIC BLOOD PRESSURE: 128 MMHG

## 2021-11-10 DIAGNOSIS — G89.29 CHRONIC LEFT-SIDED LOW BACK PAIN WITHOUT SCIATICA: ICD-10-CM

## 2021-11-10 DIAGNOSIS — G89.29 CHRONIC PAIN OF BOTH SHOULDERS: ICD-10-CM

## 2021-11-10 DIAGNOSIS — M25.511 CHRONIC PAIN OF BOTH SHOULDERS: ICD-10-CM

## 2021-11-10 DIAGNOSIS — Z00.00 ANNUAL PHYSICAL EXAM: ICD-10-CM

## 2021-11-10 DIAGNOSIS — K76.0 FATTY LIVER: ICD-10-CM

## 2021-11-10 DIAGNOSIS — M25.512 CHRONIC PAIN OF BOTH SHOULDERS: ICD-10-CM

## 2021-11-10 DIAGNOSIS — M54.50 CHRONIC LEFT-SIDED LOW BACK PAIN WITHOUT SCIATICA: ICD-10-CM

## 2021-11-10 DIAGNOSIS — E11.65 UNCONTROLLED TYPE 2 DIABETES MELLITUS WITH HYPERGLYCEMIA: Primary | ICD-10-CM

## 2021-11-10 DIAGNOSIS — I10 HYPERTENSION, UNSPECIFIED TYPE: ICD-10-CM

## 2021-11-10 PROCEDURE — 3051F PR MOST RECENT HEMOGLOBIN A1C LEVEL 7.0 - < 8.0%: ICD-10-PCS | Mod: CPTII,S$GLB,, | Performed by: FAMILY MEDICINE

## 2021-11-10 PROCEDURE — 3079F DIAST BP 80-89 MM HG: CPT | Mod: CPTII,S$GLB,, | Performed by: FAMILY MEDICINE

## 2021-11-10 PROCEDURE — 4010F ACE/ARB THERAPY RXD/TAKEN: CPT | Mod: CPTII,S$GLB,, | Performed by: FAMILY MEDICINE

## 2021-11-10 PROCEDURE — 3074F PR MOST RECENT SYSTOLIC BLOOD PRESSURE < 130 MM HG: ICD-10-PCS | Mod: CPTII,S$GLB,, | Performed by: FAMILY MEDICINE

## 2021-11-10 PROCEDURE — 3051F HG A1C>EQUAL 7.0%<8.0%: CPT | Mod: CPTII,S$GLB,, | Performed by: FAMILY MEDICINE

## 2021-11-10 PROCEDURE — 73030 XR SHOULDER COMPLETE 2 OR MORE VIEWS BILATERAL: ICD-10-PCS | Mod: 26,,, | Performed by: RADIOLOGY

## 2021-11-10 PROCEDURE — 90732 PPSV23 VACC 2 YRS+ SUBQ/IM: CPT | Mod: S$GLB,,, | Performed by: FAMILY MEDICINE

## 2021-11-10 PROCEDURE — 1159F PR MEDICATION LIST DOCUMENTED IN MEDICAL RECORD: ICD-10-PCS | Mod: CPTII,S$GLB,, | Performed by: FAMILY MEDICINE

## 2021-11-10 PROCEDURE — 90471 IMMUNIZATION ADMIN: CPT | Mod: S$GLB,,, | Performed by: FAMILY MEDICINE

## 2021-11-10 PROCEDURE — 3079F PR MOST RECENT DIASTOLIC BLOOD PRESSURE 80-89 MM HG: ICD-10-PCS | Mod: CPTII,S$GLB,, | Performed by: FAMILY MEDICINE

## 2021-11-10 PROCEDURE — 73030 X-RAY EXAM OF SHOULDER: CPT | Mod: 26,,, | Performed by: RADIOLOGY

## 2021-11-10 PROCEDURE — 3066F NEPHROPATHY DOC TX: CPT | Mod: CPTII,S$GLB,, | Performed by: FAMILY MEDICINE

## 2021-11-10 PROCEDURE — 3008F PR BODY MASS INDEX (BMI) DOCUMENTED: ICD-10-PCS | Mod: CPTII,S$GLB,, | Performed by: FAMILY MEDICINE

## 2021-11-10 PROCEDURE — 99214 PR OFFICE/OUTPT VISIT, EST, LEVL IV, 30-39 MIN: ICD-10-PCS | Mod: 25,S$GLB,, | Performed by: FAMILY MEDICINE

## 2021-11-10 PROCEDURE — 99214 OFFICE O/P EST MOD 30 MIN: CPT | Mod: 25,S$GLB,, | Performed by: FAMILY MEDICINE

## 2021-11-10 PROCEDURE — 4010F PR ACE/ARB THEARPY RXD/TAKEN: ICD-10-PCS | Mod: CPTII,S$GLB,, | Performed by: FAMILY MEDICINE

## 2021-11-10 PROCEDURE — 90471 PNEUMOCOCCAL POLYSACCHARIDE VACCINE 23-VALENT =>2YO SQ IM: ICD-10-PCS | Mod: S$GLB,,, | Performed by: FAMILY MEDICINE

## 2021-11-10 PROCEDURE — 1159F MED LIST DOCD IN RCRD: CPT | Mod: CPTII,S$GLB,, | Performed by: FAMILY MEDICINE

## 2021-11-10 PROCEDURE — 1160F RVW MEDS BY RX/DR IN RCRD: CPT | Mod: CPTII,S$GLB,, | Performed by: FAMILY MEDICINE

## 2021-11-10 PROCEDURE — 99999 PR PBB SHADOW E&M-EST. PATIENT-LVL V: ICD-10-PCS | Mod: PBBFAC,,, | Performed by: FAMILY MEDICINE

## 2021-11-10 PROCEDURE — 3008F BODY MASS INDEX DOCD: CPT | Mod: CPTII,S$GLB,, | Performed by: FAMILY MEDICINE

## 2021-11-10 PROCEDURE — 3061F NEG MICROALBUMINURIA REV: CPT | Mod: CPTII,S$GLB,, | Performed by: FAMILY MEDICINE

## 2021-11-10 PROCEDURE — 3074F SYST BP LT 130 MM HG: CPT | Mod: CPTII,S$GLB,, | Performed by: FAMILY MEDICINE

## 2021-11-10 PROCEDURE — 3066F PR DOCUMENTATION OF TREATMENT FOR NEPHROPATHY: ICD-10-PCS | Mod: CPTII,S$GLB,, | Performed by: FAMILY MEDICINE

## 2021-11-10 PROCEDURE — 73030 X-RAY EXAM OF SHOULDER: CPT | Mod: TC,50,FY,PO

## 2021-11-10 PROCEDURE — 3061F PR NEG MICROALBUMINURIA RESULT DOCUMENTED/REVIEW: ICD-10-PCS | Mod: CPTII,S$GLB,, | Performed by: FAMILY MEDICINE

## 2021-11-10 PROCEDURE — 90732 PNEUMOCOCCAL POLYSACCHARIDE VACCINE 23-VALENT =>2YO SQ IM: ICD-10-PCS | Mod: S$GLB,,, | Performed by: FAMILY MEDICINE

## 2021-11-10 PROCEDURE — 99999 PR PBB SHADOW E&M-EST. PATIENT-LVL V: CPT | Mod: PBBFAC,,, | Performed by: FAMILY MEDICINE

## 2021-11-10 PROCEDURE — 1160F PR REVIEW ALL MEDS BY PRESCRIBER/CLIN PHARMACIST DOCUMENTED: ICD-10-PCS | Mod: CPTII,S$GLB,, | Performed by: FAMILY MEDICINE

## 2021-11-18 ENCOUNTER — CLINICAL SUPPORT (OUTPATIENT)
Dept: REHABILITATION | Facility: HOSPITAL | Age: 61
End: 2021-11-18
Payer: COMMERCIAL

## 2021-11-18 DIAGNOSIS — M53.86 DECREASED ROM OF LUMBAR SPINE: ICD-10-CM

## 2021-11-18 DIAGNOSIS — M54.50 CHRONIC LEFT-SIDED LOW BACK PAIN WITHOUT SCIATICA: ICD-10-CM

## 2021-11-18 DIAGNOSIS — G89.29 CHRONIC LEFT-SIDED LOW BACK PAIN WITHOUT SCIATICA: ICD-10-CM

## 2021-11-18 PROCEDURE — 97161 PT EVAL LOW COMPLEX 20 MIN: CPT | Mod: PN

## 2021-11-18 PROCEDURE — 97110 THERAPEUTIC EXERCISES: CPT | Mod: PN

## 2021-11-22 ENCOUNTER — CLINICAL SUPPORT (OUTPATIENT)
Dept: REHABILITATION | Facility: HOSPITAL | Age: 61
End: 2021-11-22
Payer: COMMERCIAL

## 2021-11-22 DIAGNOSIS — M53.86 DECREASED ROM OF LUMBAR SPINE: ICD-10-CM

## 2021-11-22 PROCEDURE — 97140 MANUAL THERAPY 1/> REGIONS: CPT | Mod: PN,CQ

## 2021-11-22 PROCEDURE — 97110 THERAPEUTIC EXERCISES: CPT | Mod: PN,CQ

## 2021-12-01 ENCOUNTER — OFFICE VISIT (OUTPATIENT)
Dept: ORTHOPEDICS | Facility: CLINIC | Age: 61
End: 2021-12-01
Payer: COMMERCIAL

## 2021-12-01 ENCOUNTER — CLINICAL SUPPORT (OUTPATIENT)
Dept: REHABILITATION | Facility: HOSPITAL | Age: 61
End: 2021-12-01
Payer: COMMERCIAL

## 2021-12-01 VITALS — HEIGHT: 71 IN | BODY MASS INDEX: 31.92 KG/M2 | WEIGHT: 228 LBS

## 2021-12-01 DIAGNOSIS — G89.29 CHRONIC PAIN OF BOTH SHOULDERS: ICD-10-CM

## 2021-12-01 DIAGNOSIS — M19.011 OSTEOARTHRITIS OF BOTH SHOULDERS, UNSPECIFIED OSTEOARTHRITIS TYPE: Primary | ICD-10-CM

## 2021-12-01 DIAGNOSIS — M25.511 CHRONIC PAIN OF BOTH SHOULDERS: ICD-10-CM

## 2021-12-01 DIAGNOSIS — M19.012 OSTEOARTHRITIS OF BOTH SHOULDERS, UNSPECIFIED OSTEOARTHRITIS TYPE: Primary | ICD-10-CM

## 2021-12-01 DIAGNOSIS — M25.512 CHRONIC PAIN OF BOTH SHOULDERS: ICD-10-CM

## 2021-12-01 DIAGNOSIS — M53.86 DECREASED ROM OF LUMBAR SPINE: ICD-10-CM

## 2021-12-01 PROCEDURE — 99203 PR OFFICE/OUTPT VISIT, NEW, LEVL III, 30-44 MIN: ICD-10-PCS | Mod: S$GLB,,, | Performed by: STUDENT IN AN ORGANIZED HEALTH CARE EDUCATION/TRAINING PROGRAM

## 2021-12-01 PROCEDURE — 99999 PR PBB SHADOW E&M-EST. PATIENT-LVL IV: ICD-10-PCS | Mod: PBBFAC,,, | Performed by: STUDENT IN AN ORGANIZED HEALTH CARE EDUCATION/TRAINING PROGRAM

## 2021-12-01 PROCEDURE — 97140 MANUAL THERAPY 1/> REGIONS: CPT | Mod: PN

## 2021-12-01 PROCEDURE — 99203 OFFICE O/P NEW LOW 30 MIN: CPT | Mod: S$GLB,,, | Performed by: STUDENT IN AN ORGANIZED HEALTH CARE EDUCATION/TRAINING PROGRAM

## 2021-12-01 PROCEDURE — 99999 PR PBB SHADOW E&M-EST. PATIENT-LVL IV: CPT | Mod: PBBFAC,,, | Performed by: STUDENT IN AN ORGANIZED HEALTH CARE EDUCATION/TRAINING PROGRAM

## 2021-12-01 PROCEDURE — 3066F PR DOCUMENTATION OF TREATMENT FOR NEPHROPATHY: ICD-10-PCS | Mod: CPTII,S$GLB,, | Performed by: STUDENT IN AN ORGANIZED HEALTH CARE EDUCATION/TRAINING PROGRAM

## 2021-12-01 PROCEDURE — 3061F NEG MICROALBUMINURIA REV: CPT | Mod: CPTII,S$GLB,, | Performed by: STUDENT IN AN ORGANIZED HEALTH CARE EDUCATION/TRAINING PROGRAM

## 2021-12-01 PROCEDURE — 3061F PR NEG MICROALBUMINURIA RESULT DOCUMENTED/REVIEW: ICD-10-PCS | Mod: CPTII,S$GLB,, | Performed by: STUDENT IN AN ORGANIZED HEALTH CARE EDUCATION/TRAINING PROGRAM

## 2021-12-01 PROCEDURE — 97110 THERAPEUTIC EXERCISES: CPT | Mod: PN

## 2021-12-01 PROCEDURE — 4010F PR ACE/ARB THEARPY RXD/TAKEN: ICD-10-PCS | Mod: CPTII,S$GLB,, | Performed by: STUDENT IN AN ORGANIZED HEALTH CARE EDUCATION/TRAINING PROGRAM

## 2021-12-01 PROCEDURE — 3066F NEPHROPATHY DOC TX: CPT | Mod: CPTII,S$GLB,, | Performed by: STUDENT IN AN ORGANIZED HEALTH CARE EDUCATION/TRAINING PROGRAM

## 2021-12-01 PROCEDURE — 4010F ACE/ARB THERAPY RXD/TAKEN: CPT | Mod: CPTII,S$GLB,, | Performed by: STUDENT IN AN ORGANIZED HEALTH CARE EDUCATION/TRAINING PROGRAM

## 2021-12-07 ENCOUNTER — CLINICAL SUPPORT (OUTPATIENT)
Dept: REHABILITATION | Facility: HOSPITAL | Age: 61
End: 2021-12-07
Payer: COMMERCIAL

## 2021-12-07 DIAGNOSIS — M53.86 DECREASED ROM OF LUMBAR SPINE: ICD-10-CM

## 2021-12-07 PROCEDURE — 97110 THERAPEUTIC EXERCISES: CPT | Mod: PN

## 2021-12-07 PROCEDURE — 97140 MANUAL THERAPY 1/> REGIONS: CPT | Mod: PN

## 2021-12-14 ENCOUNTER — CLINICAL SUPPORT (OUTPATIENT)
Dept: REHABILITATION | Facility: HOSPITAL | Age: 61
End: 2021-12-14
Payer: COMMERCIAL

## 2021-12-14 DIAGNOSIS — M53.86 DECREASED ROM OF LUMBAR SPINE: ICD-10-CM

## 2021-12-14 PROCEDURE — 97110 THERAPEUTIC EXERCISES: CPT | Mod: PN

## 2021-12-14 PROCEDURE — 97140 MANUAL THERAPY 1/> REGIONS: CPT | Mod: PN

## 2021-12-21 ENCOUNTER — CLINICAL SUPPORT (OUTPATIENT)
Dept: REHABILITATION | Facility: HOSPITAL | Age: 61
End: 2021-12-21
Payer: COMMERCIAL

## 2021-12-21 DIAGNOSIS — M53.86 DECREASED ROM OF LUMBAR SPINE: ICD-10-CM

## 2021-12-21 DIAGNOSIS — M19.011 OSTEOARTHRITIS OF BOTH SHOULDERS, UNSPECIFIED OSTEOARTHRITIS TYPE: ICD-10-CM

## 2021-12-21 DIAGNOSIS — M19.012 OSTEOARTHRITIS OF BOTH SHOULDERS, UNSPECIFIED OSTEOARTHRITIS TYPE: ICD-10-CM

## 2021-12-21 PROCEDURE — 97110 THERAPEUTIC EXERCISES: CPT | Mod: PN

## 2021-12-21 PROCEDURE — 97140 MANUAL THERAPY 1/> REGIONS: CPT | Mod: PN

## 2022-01-05 ENCOUNTER — OFFICE VISIT (OUTPATIENT)
Dept: OPHTHALMOLOGY | Facility: CLINIC | Age: 62
End: 2022-01-05
Payer: COMMERCIAL

## 2022-01-05 DIAGNOSIS — H40.053 BILATERAL OCULAR HYPERTENSION: ICD-10-CM

## 2022-01-05 DIAGNOSIS — E11.36 DIABETIC CATARACT: ICD-10-CM

## 2022-01-05 DIAGNOSIS — D31.31 CHOROIDAL NEVUS OF RIGHT EYE: Primary | ICD-10-CM

## 2022-01-05 DIAGNOSIS — E11.9 DIABETES MELLITUS WITHOUT COMPLICATION: ICD-10-CM

## 2022-01-05 PROCEDURE — 2023F DILAT RTA XM W/O RTNOPTHY: CPT | Mod: CPTII,S$GLB,, | Performed by: OPTOMETRIST

## 2022-01-05 PROCEDURE — 2023F PR DILATED RETINAL EXAM W/O EVID OF RETINOPATHY: ICD-10-PCS | Mod: CPTII,S$GLB,, | Performed by: OPTOMETRIST

## 2022-01-05 PROCEDURE — 99999 PR PBB SHADOW E&M-EST. PATIENT-LVL II: CPT | Mod: PBBFAC,,, | Performed by: OPTOMETRIST

## 2022-01-05 PROCEDURE — 99999 PR PBB SHADOW E&M-EST. PATIENT-LVL II: ICD-10-PCS | Mod: PBBFAC,,, | Performed by: OPTOMETRIST

## 2022-01-05 PROCEDURE — 92014 COMPRE OPH EXAM EST PT 1/>: CPT | Mod: S$GLB,,, | Performed by: OPTOMETRIST

## 2022-01-05 PROCEDURE — 1159F PR MEDICATION LIST DOCUMENTED IN MEDICAL RECORD: ICD-10-PCS | Mod: CPTII,S$GLB,, | Performed by: OPTOMETRIST

## 2022-01-05 PROCEDURE — 1159F MED LIST DOCD IN RCRD: CPT | Mod: CPTII,S$GLB,, | Performed by: OPTOMETRIST

## 2022-01-05 PROCEDURE — 92014 PR EYE EXAM, EST PATIENT,COMPREHESV: ICD-10-PCS | Mod: S$GLB,,, | Performed by: OPTOMETRIST

## 2022-01-05 NOTE — PROGRESS NOTES
HPI     NP to DKT  Patient here today for yearly eye exam  Diagnosed with diabetes in 2012  Lab Results       Component                Value               Date                       HGBA1C                   7.3 (H)             11/03/2021            Vision changes since last eye exam?: None noticed  Wears Trifcal glasses full-time     Any eye pain today: No    Other ocular symptoms: Redness from fall a few days ago    Interested in contact lens fitting today? No        1. DM  2.  BDR   3. Nevus OS    Last edited by Renée To, PCT on 1/5/2022  8:12 AM. (History)            Assessment /Plan     For exam results, see Encounter Report.    Choroidal nevus of right eye  Stable, flat, no lipo. Observe.   Diabetic cataract  Not visually significant.   Bilateral ocular hypertension  High on Icare. Rechecked with Applanation. Normal. Observe. gOCT normal   Diabetes type 2 with no retinopathy  Improving, previous mild background dbh resolved. Observe. Continue strict bp/bs control       RTC  6 months for repeat gOCT with DFE and IOP check sooner if any changes to vision or worsening symptoms.

## 2022-01-05 NOTE — PROGRESS NOTES
"[facility]   Physical Therapy Treatment Note     Name: Alex Jaeger [facility] Number: [id number]    Therapy Diagnosis:   Encounter Diagnosis   Name Primary?    Decreased ROM of lumbar spine      Physician: Kirby Raygoza MD    Visit Date: 1/6/2022    Physician Orders: PT Eval and Treat   Medical Diagnosis from Referral: M54.50,G89.29 (ICD-10-CM) - Chronic left-sided low back pain without sciatica  M19.011,M19.012 (ICD-10-CM) - Osteoarthritis of both shoulders, unspecified osteoarthritis type  Evaluation Date: 11/18/2021  Authorization Period Expiration: 12/31/2021  Plan of Care Expiration: 2/18/2022  Visit # / Visits authorized: 1/ 20 (7 total)  PTA Visit #: 0     PN DUE: 1/21/2021    Precautions: Diabetes and cancer    Time In: 4:00 (patient showed up at the wrong time)  Time Out: 4:40  Total Billable Time: 40 minutes      SUBJECTIVE     Pt reports: shoulder and back about the same. He beat up his leg over the milind holidays by tripping over a grill. Back is really bad. Hot baths are helping him sleep.   He was compliant with home exercise program.  Response to previous treatment: no change reported   Functional change: none reported    Pain: 5/10 shoulder pain, 8/10 back pain    OBJECTIVE     Objective Measures updated at progress report unless specified.      TREATMENT     Mil received the treatments listed below:      THERAPEUTIC EXERCISES to develop  strength, endurance, ROM, flexibility, posture and core stabilization for 20 minutes including (exercises performed today are bolded):    DKTC 10 x 10"   Hamstring stretch 5 x 15"   Seated forward flexion 10 + L lower extremity straight (mimicing nerve flossing)   Open books x 10 each way  Piriformis stretch 5 x 15"   Modified piriformis stretch 5 x 10"   Bridges 2 x 10   Alt hip abd/add with ball and belt 10 x 10"   SL reverse clams 2 x 10   SL abd x 10 (difficult, hip weakness)   Steam boats x 10   SL clams    Standing " "IT band stretch 3 x 15"   Seated thoracic ext 2 x 10   + Supine modified raquel stretch 2 x 30"    Shoulders:   Pulleys flex and abd 1 min each   Low pec stretch/ bicep stretch 3 x 30"    RTB rows 2 x 10   RTB bilateral ER 2 x 10   Free motion face pulls 10# 2 x 10    Lat pulls 7# 2 x 10    tricep ext 10# 2 x 10     MANUAL THERAPY TECHNIQUES were applied for 10 minutes including:     Manual stretching to L piriformis, HS, internal rotation stretch    MFR of musculature over iliac crest and SI joint  MFR of TFL/ IT band, piriformis, musculature over iliac crest   Manual hip traction   manual low back and hip traction     Shoulder posterior and inferior mobs  MFR of bilateral pec major and anterior musculature of shoulder     Mil received the following supervised modalities after being cleared for contradictions: Mechanical Traction:  Aelx received intermittent mechanical traction to the lumbar spine at a force of 115 pounds for a total of 10 minutes. Hold time of 15 seconds and rest time for 10 seconds. Patient tolerated treatment well without any adverse effects.    PATIENT EDUCATION AND HOME EXERCISES     Home Exercises Provided and Patient Education Provided     Education provided:   - educated pt that mild soreness is an expected outcome to therapy session  - educated pt to continue with HEP as issued and prescribed    Written Home Exercises Provided: Patient instructed to cont prior HEP. Exercises were reviewed and Mil was able to demonstrate them prior to the end of the session.  Mil demonstrated good  understanding of the education provided. See EMR under Patient Instructions for exercises provided during therapy sessions    ASSESSMENT     Patient presents with increased back pain. Patient reports his back pain preventing him from sleeping and hot baths are the only thing giving him relief. Patient reported he wanted to do shoulder exercises secondary to severity of low back pain. Shoulder exercises were " progressed to free motion for increased scapular stability and upper extremity strength. Lumbar Traction was perform for decreased pain and pressure in the low back. Patient reported decreased back pain after traction and manual therapy. Patient will continue to benefit from therapy for increased hip and shoulder stability, decreased low back and shoulder pain, increased lumbar and shoulder range of motion, and return to prior level of function.     Mil is progressing well towards his goals.   Pt prognosis is Good.     Pt will continue to benefit from skilled outpatient physical therapy to address the deficits listed in the problem list box on initial evaluation, provide pt/family education and to maximize pt's level of independence in the home and community environment.     Pt's spiritual, cultural and educational needs considered and pt agreeable to plan of care and goals.     Anticipated barriers to physical therapy: pain, decreased exercise tolerance, decreased availability to attend therapy    Goals:   Short Term Goals:  4 weeks   1.  Mobility: Patient will present with full lumbar AROM in order to return to maximal functional potential and improve quality of life. PC   2.  HEP: Patient will demonstrate independence with HEP in order to progress toward functional independence. PC      Long Term Goals:  8 weeks   1. Function: Patient will demonstrate improved function as indicated by a functional limitation score of 37% on the FOTO. PC   2. Patient will return to normal ADL's, IADL's, community involvement, recreational activities, and work-related activities with less than or equal to 3/10 pain and maximal function. PC   3. Patient will present with full bilateral shoulder AROM for return to prior level of function. PC   4. Patient will present with increased of bilateral shoulder strength to +4/5 MMT for maximal function. PC      Goals Key:  PC= progressing/continue;        PM= partially met;             DC=  discontinue        PLAN     Continue PT per POC, assess tolerance to today's session,  progress exercise as tolerated and appropriate    Lori Fatima, PT, DPT, PPCES   01/07/2022

## 2022-01-06 ENCOUNTER — CLINICAL SUPPORT (OUTPATIENT)
Dept: REHABILITATION | Facility: HOSPITAL | Age: 62
End: 2022-01-06
Payer: COMMERCIAL

## 2022-01-06 DIAGNOSIS — M53.86 DECREASED ROM OF LUMBAR SPINE: ICD-10-CM

## 2022-01-06 PROCEDURE — 97012 MECHANICAL TRACTION THERAPY: CPT | Mod: PN

## 2022-01-06 PROCEDURE — 97110 THERAPEUTIC EXERCISES: CPT | Mod: PN

## 2022-01-06 PROCEDURE — 97140 MANUAL THERAPY 1/> REGIONS: CPT | Mod: PN,59

## 2022-02-21 ENCOUNTER — DOCUMENTATION ONLY (OUTPATIENT)
Dept: REHABILITATION | Facility: HOSPITAL | Age: 62
End: 2022-02-21
Payer: COMMERCIAL

## 2022-02-21 NOTE — PROGRESS NOTES
[facility]   Discharge Note    Name: Alex Ballard  [facility] Number: [id number]    Therapy Diagnosis:        Encounter Diagnosis   Name Primary?    Decreased ROM of lumbar spine        Physician: Kirby Raygoza MD     Visit Date: 1/6/2022     Physician Orders: PT Eval and Treat   Medical Diagnosis from Referral: M54.50,G89.29 (ICD-10-CM) - Chronic left-sided low back pain without sciatica  M19.011,M19.012 (ICD-10-CM) - Osteoarthritis of both shoulders, unspecified osteoarthritis type  Evaluation Date: 11/18/2021    Date of Last visit: 1/6/2022  Total Visits Received: 7    ASSESSMENT      Discharge reason: Patient has not attended therapy since 1/6/2022    Goals:   Short Term Goals:  4 weeks   1.  Mobility: Patient will present with full lumbar AROM in order to return to maximal functional potential and improve quality of life. PC   2.  HEP: Patient will demonstrate independence with HEP in order to progress toward functional independence. PC      Long Term Goals:  8 weeks   1. Function: Patient will demonstrate improved function as indicated by a functional limitation score of 37% on the FOTO. PC   2. Patient will return to normal ADL's, IADL's, community involvement, recreational activities, and work-related activities with less than or equal to 3/10 pain and maximal function. PC   3. Patient will present with full bilateral shoulder AROM for return to prior level of function. PC   4. Patient will present with increased of bilateral shoulder strength to +4/5 MMT for maximal function. PC      Goals Key:  PC= progressing/continue;        PM= partially met;             DC= discontinue              PLAN   This patient is discharged from Physical Therapy    Lori Fatima, PT, DPT, PPCES  02/21/2022

## 2022-06-11 NOTE — TELEPHONE ENCOUNTER
No new care gaps identified.  [facility] Embedded Care Gaps. Reference number: [id number]. 6/11/2022   6:04:18 AM DOTT

## 2022-06-13 RX ORDER — SIMVASTATIN 20 MG/1
TABLET, FILM COATED ORAL
Qty: 90 TABLET | Refills: 0 | Status: SHIPPED | OUTPATIENT
Start: 2022-06-13 | End: 2022-07-18

## 2022-06-13 RX ORDER — LISINOPRIL 10 MG/1
TABLET ORAL
Qty: 90 TABLET | Refills: 1 | Status: SHIPPED | OUTPATIENT
Start: 2022-06-13

## 2022-06-13 RX ORDER — METFORMIN HYDROCHLORIDE 1000 MG/1
TABLET ORAL
Qty: 180 TABLET | Refills: 0 | Status: SHIPPED | OUTPATIENT
Start: 2022-06-13 | End: 2022-07-18

## 2022-06-13 NOTE — TELEPHONE ENCOUNTER
Refill Routing Note   Medication(s) are not appropriate for processing by [facility] for the following reason(s):      - Required laboratory values are outdated    [facility] action(s):  Defer  Approve          --->Care Gap information included in message below if applicable.   Medication reconciliation completed: No   Automatic Epic Generated Protocol Data:    Orders Placed This Encounter    lisinopriL 10 MG tablet      Requested Prescriptions   Pending Prescriptions Disp Refills    metFORMIN (GLUCOPHAGE) 1000 MG tablet [Pharmacy Med Name: METFORMIN 1000MG TABLETS] 180 tablet 0     Sig: TAKE 1 TABLET(1000 MG) BY MOUTH TWICE DAILY WITH MEALS.       Endocrinology:  Diabetes - Biguanides Failed - 6/11/2022  6:03 AM        Failed - Matches previous order       Previous Authorizing Provider: Kirby Moses MD (metFORMIN (GLUCOPHAGE) 1000 MG tablet)  Previous Authorizing Provider: Kirby Moses MD (simvastatin (ZOCOR) 20 MG tablet)  Previous Authorizing Provider: Kirby Moses MD (lisinopriL 10 MG tablet)  [facility] [address] [place] [address] AT [facility] [address] [facility]  Previous Pharmacy: [facility] [address] [place] [address] AT [facility] [address] [facility]  Previous Pharm[facility]: [facility] [address] [place] [address] AT [facility] [address] [place]             Failed - HBA1C within 180 days     Lab Results   Component Value Date    HGBA1C 7.3 (H) 11/03/2021    HGBA1C 7.8 (H) 07/30/2021    HGBA1C 10.4 (H) 05/11/2021              Passed - Patient is at least 18 years old        Passed - Valid encounter within last 15 months     Recent Visits  Date Type Provider Dept   11/10/21 Office Visit Kirby Moses MD [facility] Internal Medicine   08/13/21 Office Visit Kirby Moses MD [facility] Medicine   04/23/21 Office Visit Kirby Moses MD [facility] Internal Medicine   Showing recent visits within past 720 days  and meeting all other requirements  Future Appointments  No visits were found meeting these conditions.  Showing future appointments within next 150 days and meeting all other requirements                Passed - No ED/Hospital visits since last PCP visit     Last PCP Visit: 11/10/2021 Last Admission:  Last ED Visit:           Passed - Cr is 1.39 or below and within 360 days     Lab Results   Component Value Date    CREATININE 0.8 11/03/2021    CREATININE 0.9 07/30/2021    CREATININE 0.9 05/11/2021              Passed - eGFR is 45 or above and within 360 days     Lab Results   Component Value Date    EGFRNONAA >60.0 11/03/2021    EGFRNONAA >60.0 07/30/2021    EGFRNONAA >60.0 05/11/2021                  simvastatin (ZOCOR) 20 MG tablet [Pharmacy Med Name: SIMVASTATIN 20MG TABLETS] 90 tablet 0     Sig: TAKE 1 TABLET(20 MG) BY MOUTH EVERY EVENING.       Cardiovascular:  Antilipid - Statins Failed - 6/11/2022  6:03 AM        Failed - Matches previous order       Previous Authorizing Provider: Kirby Moses MD (metFORMIN (GLUCOPHAGE) 1000 MG tablet)  Previous Authorizing Provider: Kirby Moses MD (simvastatin (ZOCOR) 20 MG tablet)  Previous Authorizing Provider: Kirby Moses MD (lisinopriL 10 MG tablet)  Previous Pharmacy: [facility] [address] [place] [address] AT [place] [address]  [facility]: [facility] [address] [place] [address] AT [place] [address]  Previous Pharmacy: [facility] [address] [place] [address] AT [place] [address]            Failed - Total Cholesterol within 360 days     Lab Results   Component Value Date    CHOL 155 05/11/2021              Failed - LDL within 360 days     LDL Cholesterol   Date Value Ref Range Status   05/11/2021 81.2 63.0 - 159.0 mg/dL Final     Comment:     The National Cholesterol Education Program (NCEP) has set the  following guidelines (reference values) for  LDL Cholesterol:  Optimal.......................<130 mg/dL  Borderline High...............130-159 mg/dL  High..........................160-189 mg/dL  Very High.....................>190 mg/dL              Failed - HDL within 360 days     HDL   Date Value Ref Range Status   05/11/2021 50 40 - 75 mg/dL Final     Comment:     The National Cholesterol Education Program (NCEP) has set the  following guidelines (reference values) for HDL Cholesterol:  Low...............<40 mg/dL  Optimal...........>60 mg/dL              Failed - Triglycerides within 360 days     Lab Results   Component Value Date    TRIG 119 05/11/2021              Passed - Patient is at least 18 years old        Passed - Valid encounter within last 15 months     Recent Visits  Date Type Provider Dept   11/10/21 Office Visit Kirby Moses MD [facility] Internal Medicine   08/13/21 Office Visit Kirby Moses MD [facility] Internal Medicine   04/23/21 Office Visit Kirby Moses MD [facility] Internal Medicine   Showing recent visits within past 720 days and meeting all other requirements  Future Appointments  No visits were found meeting these conditions.  Showing future appointments within next 150 days and meeting all other requirements                Passed - No ED/Hospital visits since last PCP visit     Last PCP Visit: 11/10/2021 Last Admission:  Last ED Visit:           Passed - ALT is 131 or below and within 360 days     ALT   Date Value Ref Range Status   11/03/2021 44 10 - 44 U/L Final   07/30/2021 50 (H) 10 - 44 U/L Final   05/18/2021 46 (H) 10 - 44 U/L Final              Passed - AST is 119 or below and within 360 days     AST   Date Value Ref Range Status   11/03/2021 39 10 - 40 U/L Final   07/30/2021 64 (H) 10 - 40 U/L Final   05/18/2021 39 10 - 40 U/L Final               Signed Prescriptions Disp Refills    lisinopriL 10 MG tablet 90 tablet 1     Sig: TAKE 1 TABLET(10 MG) BY MOUTH EVERY DAY.       Cardiovascular:  ACE Inhibitors Failed -  6/11/2022  6:03 AM        Failed - Matches previous order       Previous Authorizing Provider: Kirby Moses MD (metFORMIN (GLUCOPHAGE) 1000 MG tablet)  Previous Authorizing Provider: Kirby Moses MD (simvastatin (ZOCOR) 20 MG tablet)  Previous Authorizing Provider: Kirby Moses MD (lisinopriL 10 MG tablet)  Previous Pharmacy: [facility] [address] [place] [address] AT [place] [address]  Previous Pharmacy: [facility] [address] [place] [address] AT [place] [address]  Previous Pharmacy: [facility] [address] [place] [address] AT [facility] [address] [place]             Passed - Patient is at least 18 years old        Passed - Last BP in normal range within 360 days     BP Readings from Last 3 Encounters:   11/10/21 128/82   08/23/21 120/78   08/13/21 118/82               Passed - Valid encounter within last 15 months     Recent Visits  Date Type Provider Dept   11/10/21 Office Visit Kirby Moses MD [facility] Internal Medicine   08/13/21 Office Visit Kirby Moses MD [facility] Internal Medicine   04/23/21 Office Visit Kirby Moses MD [facility] Internal Medicine   Showing recent visits within past 720 days and meeting all other requirements  Future Appointments  No visits were found meeting these conditions.  Showing future appointments within next 150 days and meeting all other requirements                Passed - No ED/Hospital visits since last PCP visit     Last PCP Visit: 11/10/2021 Last Admission:  Last ED Visit:           Passed - Cr is 1.39 or below and within 360 days     Lab Results   Component Value Date    CREATININE 0.8 11/03/2021    CREATININE 0.9 07/30/2021    CREATININE 0.9 05/11/2021              Passed - K is 5.2 or below and within 360 days     Potassium   Date Value Ref Range Status   11/03/2021 4.5 3.5 - 5.1 mmol/L Final   07/30/2021 4.4 3.5 - 5.1 mmol/L Final   05/11/2021 4.5 3.5 - 5.1 mmol/L  Final              Passed - eGFR within 360 days     Lab Results   Component Value Date    EGFRNONAA >60.0 11/03/2021    EGFRNONAA >60.0 07/30/2021    EGFRNONAA >60.0 05/11/2021                      Appointments  past 12m or future 3m with PCP    Date Provider   Last Visit   11/10/2021 Kirby Moses MD   Next Visit   Visit date not found Kirby Moses MD   ED visits in past 90 days: 0        Note composed:1:31 PM 06/13/2022

## 2022-07-15 NOTE — TELEPHONE ENCOUNTER
No new care gaps identified.  [facility] Embedded Care Gaps. Reference number: [id number]. 7/15/2022   4:32:22 PM CDT

## 2022-07-16 RX ORDER — TAMSULOSIN HYDROCHLORIDE 0.4 MG/1
CAPSULE ORAL
Qty: 90 CAPSULE | Refills: 1 | Status: SHIPPED | OUTPATIENT
Start: 2022-07-16

## 2022-07-16 RX ORDER — ESCITALOPRAM OXALATE 20 MG/1
TABLET ORAL
Qty: 90 TABLET | Refills: 1 | Status: SHIPPED | OUTPATIENT
Start: 2022-07-16 | End: 2023-01-26

## 2022-07-16 NOTE — TELEPHONE ENCOUNTER
Refill Routing Note   Medication(s) are not appropriate for processing by [facility] for the following reason(s):      - Required laboratory values are outdated    [facility] action(s):  Defer  Approve       Medication Therapy Plan: Simvastatin, Glimepiride, and Metformin outdated labs. Others approved  Medication reconciliation completed: No     Appointments  past 12m or future 3m with PCP    Date Provider   Last Visit   11/10/2021 Kirby Moses MD   Next Visit   Visit date not found Kirby Moses MD   ED visits in past 90 days: 0        Note composed:1:38 PM 07/16/2022

## 2022-07-18 RX ORDER — METFORMIN HYDROCHLORIDE 1000 MG/1
TABLET ORAL
Qty: 180 TABLET | Refills: 0 | Status: SHIPPED | OUTPATIENT
Start: 2022-07-18

## 2022-07-18 RX ORDER — GLIMEPIRIDE 4 MG/1
TABLET ORAL
Qty: 90 TABLET | Refills: 0 | Status: SHIPPED | OUTPATIENT
Start: 2022-07-18 | End: 2022-10-11

## 2022-07-18 RX ORDER — SIMVASTATIN 20 MG/1
TABLET, FILM COATED ORAL
Qty: 90 TABLET | Refills: 0 | Status: SHIPPED | OUTPATIENT
Start: 2022-07-18

## 2022-10-20 ENCOUNTER — PATIENT MESSAGE (OUTPATIENT)
Dept: ADMINISTRATIVE | Facility: HOSPITAL | Age: 62
End: 2022-10-20
Payer: COMMERCIAL

## 2022-11-01 ENCOUNTER — PATIENT OUTREACH (OUTPATIENT)
Dept: ADMINISTRATIVE | Facility: HOSPITAL | Age: 62
End: 2022-11-01
Payer: COMMERCIAL

## 2022-11-01 NOTE — PROGRESS NOTES
Working Dm Report:     Pt overdue for labs and ocming due for Annual. Called to schedule, no answer-LVM

## 2023-01-12 DIAGNOSIS — E11.65 UNCONTROLLED TYPE 2 DIABETES MELLITUS WITH HYPERGLYCEMIA: Primary | ICD-10-CM

## 2023-01-12 RX ORDER — GLIMEPIRIDE 4 MG/1
4 TABLET ORAL
Qty: 90 TABLET | Refills: 0 | OUTPATIENT
Start: 2023-01-12

## 2023-01-12 NOTE — TELEPHONE ENCOUNTER
Care Due:                  Date            Visit Type   Department     Provider  --------------------------------------------------------------------------------                                EP -                              PRIMARY      [facility] INTERNAL  Last Visit: 11-      [facility]   MEDICINE       Kirby Kahn  Next Visit: None Scheduled  None         None Found                                                            Last  Test          Frequency    Reason                     Performed    Due Date  --------------------------------------------------------------------------------    Office Visit  12 months..  EScitalopram, LANTUS,      11-   11-                             glimepiride, lisinopriL,                             metFORMIN, simvastatin,                             tamsulosin...............    CMP.........  12 months..  LANTUS, glimepiride,       11-   10-                             lisinopriL, metFORMIN,                             simvastatin..............    HBA1C.......  6 months...  LANTUS, glimepiride,       11- 05-                             metFORMIN................    Lipid Panel.  12 months..  simvastatin..............  05- 05-    [facility] Embedded Care Gaps. Reference number: [id number]. 1/12/2023   6:04:47 AM CST

## 2023-01-18 RX ORDER — GLIMEPIRIDE 4 MG/1
TABLET ORAL
Qty: 90 TABLET | Refills: 0 | OUTPATIENT
Start: 2023-01-18

## 2023-01-18 NOTE — TELEPHONE ENCOUNTER
Refill Decision Note   Alex Ballard  is requesting a refill authorization.  Brief Assessment and Rationale for Refill:  Quick Discontinue     Medication Therapy Plan:       Medication Reconciliation Completed: No   Comments:     No Care Gaps recommended.     Note composed:4:33 PM 01/18/2023

## 2023-01-18 NOTE — TELEPHONE ENCOUNTER
No new care gaps identified.  [facility] Embedded Care Gaps. Reference number: [id number]. 1/18/2023   9:26:04 AM CST

## 2023-01-25 ENCOUNTER — PATIENT MESSAGE (OUTPATIENT)
Dept: ADMINISTRATIVE | Facility: HOSPITAL | Age: 63
End: 2023-01-25
Payer: COMMERCIAL

## 2023-01-26 RX ORDER — ESCITALOPRAM OXALATE 20 MG/1
TABLET ORAL
Qty: 90 TABLET | Refills: 0 | Status: SHIPPED | OUTPATIENT
Start: 2023-01-26

## 2023-01-26 NOTE — TELEPHONE ENCOUNTER
Refill Decision Note   Alex Ballard  is requesting a refill authorization.  Brief Assessment and Rationale for Refill:  Approve     Medication Therapy Plan:       Medication Reconciliation Completed: No   Comments:     No Care Gaps recommended.     Note composed:4:09 PM 01/26/2023

## 2023-01-26 NOTE — TELEPHONE ENCOUNTER
No new care gaps identified.  [facility] Embedded Care Gaps. Reference number: [id number]. 1/26/2023   2:18:51 PM CST

## 2023-01-27 ENCOUNTER — PATIENT OUTREACH (OUTPATIENT)
Dept: ADMINISTRATIVE | Facility: HOSPITAL | Age: 63
End: 2023-01-27
Payer: COMMERCIAL

## 2023-01-27 NOTE — PROGRESS NOTES
Working eye exam report; Chart searched; Called and spoke with patient who states that he has moved to [place] and will no longer be [facility]. Care teams updated.

## 2023-05-02 RX ORDER — ESCITALOPRAM OXALATE 20 MG/1
TABLET ORAL
Qty: 90 TABLET | Refills: 0 | OUTPATIENT
Start: 2023-05-02

## 2023-05-02 NOTE — TELEPHONE ENCOUNTER
Refill Routing Note   Medication(s) are not appropriate for processing by [facility] for the following reason(s):      Responsible provider unclear    [facility] action(s):  Defer              Appointments  past 12m or future 3m with PCP    Date Provider   Last Visit   11/10/2021 Kirby Moses MD   Next Visit   Visit date not found Kirby Moses MD   ED visits in past 90 days: 0        Note composed:11:59 AM 05/02/2023

## 2023-05-03 NOTE — TELEPHONE ENCOUNTER
Provider Staff:  Please note Refusal of medication.     Action required for this patient.      Requested Prescriptions     Refused Prescriptions Disp Refills    EScitalopram oxalate (LEXAPRO) 20 MG tablet [Pharmacy Med Name: ESCITALOPRAM 20MG TABLETS] 90 tablet 0     Sig: TAKE 1 TABLET(20 MG) BY MOUTH EVERY DAY     Refused By: THOM HUDSON     Reason for Refusal: Patient needs an appointment      Thanks!  [facility]   Note composed: 05/03/2023 8:09 AM